# Patient Record
Sex: MALE | Race: WHITE | Employment: OTHER | URBAN - METROPOLITAN AREA
[De-identification: names, ages, dates, MRNs, and addresses within clinical notes are randomized per-mention and may not be internally consistent; named-entity substitution may affect disease eponyms.]

---

## 2018-08-14 ENCOUNTER — OFFICE VISIT (OUTPATIENT)
Dept: SLEEP MEDICINE | Age: 63
End: 2018-08-14

## 2018-08-14 VITALS
WEIGHT: 184 LBS | OXYGEN SATURATION: 98 % | SYSTOLIC BLOOD PRESSURE: 122 MMHG | HEART RATE: 67 BPM | HEIGHT: 71 IN | BODY MASS INDEX: 25.76 KG/M2 | DIASTOLIC BLOOD PRESSURE: 72 MMHG

## 2018-08-14 DIAGNOSIS — F41.9 ANXIETY: Chronic | ICD-10-CM

## 2018-08-14 DIAGNOSIS — M79.7 FIBROMYALGIA: Chronic | ICD-10-CM

## 2018-08-14 DIAGNOSIS — E03.9 ACQUIRED HYPOTHYROIDISM: Chronic | ICD-10-CM

## 2018-08-14 DIAGNOSIS — K21.9 GERD WITHOUT ESOPHAGITIS: Chronic | ICD-10-CM

## 2018-08-14 DIAGNOSIS — G89.4 CHRONIC PAIN SYNDROME: Chronic | ICD-10-CM

## 2018-08-14 DIAGNOSIS — G47.33 OBSTRUCTIVE SLEEP APNEA SYNDROME: Primary | ICD-10-CM

## 2018-08-14 PROCEDURE — 99204 OFFICE O/P NEW MOD 45 MIN: CPT | Performed by: INTERNAL MEDICINE

## 2018-08-14 RX ORDER — DULOXETIN HYDROCHLORIDE 60 MG/1
60 CAPSULE, DELAYED RELEASE ORAL DAILY
COMMUNITY

## 2018-08-14 RX ORDER — DULOXETIN HYDROCHLORIDE 60 MG/1
120 CAPSULE, DELAYED RELEASE ORAL
COMMUNITY
Start: 2018-04-17 | End: 2018-08-14

## 2018-08-14 RX ORDER — LORAZEPAM 1 MG/1
1 TABLET ORAL EVERY 6 HOURS PRN
COMMUNITY

## 2018-08-14 RX ORDER — QUININE SULFATE 324 MG/1
324 CAPSULE ORAL
COMMUNITY
Start: 2018-06-26

## 2018-08-14 RX ORDER — LORAZEPAM 1 MG/1
TABLET ORAL
COMMUNITY
Start: 2018-07-17 | End: 2018-08-14

## 2018-08-14 ASSESSMENT — ENCOUNTER SYMPTOMS
APNEA: 1
EYE PAIN: 0
VOMITING: 0
NAUSEA: 0
ABDOMINAL DISTENTION: 0
ALLERGIC/IMMUNOLOGIC NEGATIVE: 1
RHINORRHEA: 0
CHEST TIGHTNESS: 0
PHOTOPHOBIA: 0
CHOKING: 0
ABDOMINAL PAIN: 0
SHORTNESS OF BREATH: 0
BACK PAIN: 1

## 2018-08-14 ASSESSMENT — SLEEP AND FATIGUE QUESTIONNAIRES
HOW LIKELY ARE YOU TO NOD OFF OR FALL ASLEEP WHILE LYING DOWN TO REST IN THE AFTERNOON WHEN CIRCUMSTANCES PERMIT: 3
HOW LIKELY ARE YOU TO NOD OFF OR FALL ASLEEP WHILE SITTING QUIETLY AFTER LUNCH WITHOUT ALCOHOL: 2
HOW LIKELY ARE YOU TO NOD OFF OR FALL ASLEEP WHILE SITTING AND TALKING TO SOMEONE: 1
HOW LIKELY ARE YOU TO NOD OFF OR FALL ASLEEP WHILE WATCHING TV: 3
HOW LIKELY ARE YOU TO NOD OFF OR FALL ASLEEP WHEN YOU ARE A PASSENGER IN A CAR FOR AN HOUR WITHOUT A BREAK: 2
HOW LIKELY ARE YOU TO NOD OFF OR FALL ASLEEP WHILE SITTING AND READING: 2
HOW LIKELY ARE YOU TO NOD OFF OR FALL ASLEEP WHILE SITTING INACTIVE IN A PUBLIC PLACE: 2
HOW LIKELY ARE YOU TO NOD OFF OR FALL ASLEEP IN A CAR, WHILE STOPPED FOR A FEW MINUTES IN TRAFFIC: 0
NECK CIRCUMFERENCE (INCHES): 17
ESS TOTAL SCORE: 15

## 2018-08-14 NOTE — PROGRESS NOTES
Mario Schreiber MD, FAASM, [facility]  [facility] [place]  [facility]  3rd Floor,  [address] [facility], [address] [phone]   [address] [place] [address]  [address] [place] [address] Mellisa Chisholm. Lila Aragon [address] [phone]     [facility] [place] SLEEP MEDICINE    Subjective:     Patient ID: Maricarmen Logan is a 70 y.o. male. Chief Complaint   Patient presents with    Sleep Apnea       HPI:      Maricarmen Logan is a 70 y.o. male self-referred for a sleep evaluation. He complains of snoring, periods of not breathing, tossing and turning, decreased memory, decreased concentration, excessive daytime sleepiness, feels sleepy during the day, take naps during the day, sleep walking, and drowsiness while driving but he denies knees buckling with laughing, completely or partially paralyzed while falling asleep or waking up. Symptoms began >15 years ago, gradually worsening since that time. Previous evaluation and treatment has included- PSG. Dx with ZAKIA and parasomnia over 15 years ago at [facility]. No old records at time of visit. Was given CPAP and some meds and did well for a few years. Then was told could stop CPAP and did fine for few more years then Sx returned. DOT/CDL - No  FAA/'s license - No      Previous Report(s) Reviewed: historical medical records, office notes and referral letter/letters     [place] - Total score: 15    Caffeine Intake - 3 cans/bottles of caffeinated soda pop per day(s)    Social History     Social History    Marital status:      Spouse name: N/A    Number of children: N/A    Years of education: N/A     Occupational History    Not on file.      Social History Main Topics    Smoking status: Former Smoker     Packs/day: 2.00     Years: 10.00    Smokeless tobacco: Never Used    Alcohol use No    Drug use: No    Sexual activity: Not on file     Other Topics Concern    Not on file     Social History Narrative    No narrative on file        Current Outpatient Prescriptions   Medication Sig Dispense Refill    quiNINE 324 MG capsule Take 324 mg by mouth      LORazepam (ATIVAN) 1 MG tablet Take 1 mg by mouth every 6 hours as needed for Anxiety. [facility] DULoxetine (CYMBALTA) 60 MG extended release capsule Take 60 mg by mouth daily      levothyroxine (SYNTHROID) 75 MCG tablet TAKE 1 TABLET BY MOUTH EVERY DAY 90 tablet 1    oxyCODONE-acetaminophen (PERCOCET) 5-325 MG per tablet Take 1 tablet by mouth every 12 hours as needed for Pain      tamsulosin (FLOMAX) 0.4 MG capsule Take 0.4 mg by mouth every evening.  pantoprazole (PROTONIX) 40 MG tablet Take 40 mg by mouth daily as needed.  QUEtiapine (SEROQUEL) 200 MG tablet Take 200 mg by mouth every evening.  clopidogrel (PLAVIX) 75 MG tablet Take 75 mg by mouth daily.  aspirin 81 MG tablet Take 81 mg by mouth daily.  fentaNYL (DURAGESIC) 25 MCG/HR Place 1 patch onto the skin every 72 hours      atorvastatin (LIPITOR) 10 MG tablet Take 10 mg by mouth every evening. No current facility-administered medications for this visit.         Allergies as of 08/14/2018 - Review Complete 08/14/2018   Allergen Reaction Noted    Lyrica [pregabalin] Shortness Of Breath 03/31/2015    Ambien [zolpidem tartrate] Other (See Comments) 03/31/2015    Codeine Nausea Only and Rash 03/31/2015    Zofran [ondansetron hcl] Nausea And Vomiting 12/10/2015       Patient Active Problem List   Diagnosis    Chronic pain syndrome    Failed back surgical syndrome    Anxiety    Fibromyalgia    Insomnia    Lumbar spondylosis    Essential and other specified forms of tremor    Headache    Encephalitis, myelitis, and encephalomyelitis ([facility] [address]    Mild cognitive impairment    Hypothyroidism    Depressive disorder, not elsewhere classified    Lumbar degenerative disc disease    Insomnia due to medical condition    Vertigo    Ataxia    Obstructive sleep apnea syndrome       Past Medical History:   Diagnosis Date    Anxiety     CAD (coronary artery disease) 2/2/15    Diagnosed in [facility] Chronic kidney disease     After angioplasty 2/15    Chronic pain syndrome     Failed back surgical syndrome     Fibromyalgia     Hypothyroidism     Insomnia     Irregular heart beat     Lumbar spondylosis     Obstructive sleep apnea syndrome 8/14/2018       Past Surgical History:   Procedure Laterality Date    CORONARY ANGIOPLASTY WITH STENT PLACEMENT      CORONARY ANGIOPLASTY WITH STENT PLACEMENT  [facility] [address]         History reviewed. No pertinent family history. Review of Systems   Constitutional: Positive for fatigue. Negative for activity change and appetite change. HENT: Positive for congestion. Negative for nosebleeds, postnasal drip, rhinorrhea and sneezing. Eyes: Negative for photophobia, pain and visual disturbance. Respiratory: Positive for apnea. Negative for choking, chest tightness and shortness of breath. Cardiovascular: Negative. Gastrointestinal: Negative for abdominal distention, abdominal pain, nausea and vomiting. Endocrine: Negative for cold intolerance and heat intolerance. Genitourinary: Negative for difficulty urinating, dysuria, frequency and urgency. Musculoskeletal: Positive for back pain and myalgias. Negative for neck pain and neck stiffness. Skin: Negative. Allergic/Immunologic: Negative. Neurological: Negative for tremors, seizures, syncope and weakness. Hematological: Negative for adenopathy. Does not bruise/bleed easily. Psychiatric/Behavioral: Positive for decreased concentration and sleep disturbance. Negative for agitation, behavioral problems and confusion. Objective:     Vitals:  Weight BMI   Wt Readings from Last 3 Encounters:   08/14/18 184 lb (83.5 kg)   06/21/16 179 lb 3.2 oz (81.3 kg)   12/10/15 184 lb 8 oz (83.7 kg)    Body mass index is 25.66 kg/m².      BP HR SaO2   BP Readings from Last 3 Encounters:   08/14/18 122/72   06/21/16 107/64   12/10/15 99/63    Pulse Readings from Last 3 Encounters:   08/14/18 67   06/21/16 76   12/10/15 70    SpO2 Readings from Last 3 Encounters:   08/14/18 98%   06/21/16 98%   12/10/15 98%        Physical Exam   Constitutional: He is oriented to person, place, and time. Vital signs are normal. He appears well-developed and well-nourished. Non-toxic appearance. He does not have a sickly appearance. HENT:   Head: Normocephalic and atraumatic. Not macrocephalic and not microcephalic. Right Ear: External ear normal.   Left Ear: External ear normal.   Nose: Mucosal edema and septal deviation present. Mouth/Throat: Uvula is midline and mucous membranes are normal. Posterior oropharyngeal edema present. No oropharyngeal exudate or tonsillar abscesses. Tonsils:   absent bilaterally, normal appearance  Post. Pharynx:   normal mucosa  Neck circumference: 17  Inches     Eyes: Pupils are equal, round, and reactive to light. Conjunctivae, EOM and lids are normal.   Neck: Trachea normal and normal range of motion. Neck supple. No tracheal deviation present. No thyroid mass and no thyromegaly present. Cardiovascular: Normal rate, regular rhythm, S1 normal, S2 normal and normal heart sounds. Pulmonary/Chest: Effort normal and breath sounds normal. No apnea. No respiratory distress. He has no wheezes. He has no rhonchi. He has no rales. Abdominal: Soft. Bowel sounds are normal. He exhibits no distension. There is no tenderness. Musculoskeletal: Normal range of motion. He exhibits no edema. Lymphadenopathy:        Head (right side): No submental, no submandibular and no tonsillar adenopathy present. Head (left side): No submental, no submandibular and no tonsillar adenopathy present. Neurological: He is alert and oriented to person, place, and time. Skin: Skin is warm, dry and intact. No cyanosis. Nails show no clubbing.    Psychiatric: He has a normal mood and affect. His speech is normal and behavior is normal. Thought content normal.   Nursing note and vitals reviewed. Assessment:      Visit Diagnoses and Associated Orders     Obstructive sleep apnea syndrome   (New Problem)  -  Primary    needs w/u and Tx    Baseline Diagnostic Sleep Study [82500 Custom]   - Future Order         Acquired hypothyroidism   (Stable)           GERD without esophagitis   (Stable)           Anxiety   (Stable)           Fibromyalgia   (Stable)           Chronic pain syndrome   (Stable)               Plan: Will attempt to get old records. Differential diagnosis includes but not limited to: ZAKIA, PLMD's, narcolepsy, parasomnias. Reviewed ZAKIA (which is the highest likelihood diagnosis): pathophysiology, diagnosis, complications and treatment. Instructed him not to drive if drowsy. Continue medications per his PCP and other physicians. Limit caffeine use after 3pm. Will do PSG to rule-out ZAKIA and other sleep disorders. 1 wk follow up after study to review his results. The chronic medical conditions listed are directly related to the primary diagnosis listed above. The management of the primary diagnosis affects the secondary diagnosis and vice versa. Cont meds for hypothyroid, GERD, JESSY, and fibromyalgia. Given chronic narcotics needs to watch for CSA as well.     Orders Placed This Encounter   Procedures    Baseline Diagnostic Sleep Study            Electronically signed by Marylene Hirschfeld, MD on 8/14/2018 at 11:03 AM

## 2018-08-16 ENCOUNTER — HOSPITAL ENCOUNTER (OUTPATIENT)
Dept: SLEEP MEDICINE | Age: 63
Discharge: HOME | End: 2018-08-17
Attending: INTERNAL MEDICINE | Admitting: INTERNAL MEDICINE

## 2018-08-16 DIAGNOSIS — G47.33 OBSTRUCTIVE SLEEP APNEA SYNDROME: ICD-10-CM

## 2018-08-16 PROCEDURE — 95810 POLYSOM 6/> YRS 4/> PARAM: CPT | Performed by: INTERNAL MEDICINE

## 2018-08-20 ENCOUNTER — TELEPHONE (OUTPATIENT)
Dept: SLEEP MEDICINE | Age: 63
End: 2018-08-20

## 2018-08-21 ENCOUNTER — TELEPHONE (OUTPATIENT)
Dept: SLEEP MEDICINE | Age: 63
End: 2018-08-21

## 2018-08-21 NOTE — TELEPHONE ENCOUNTER
Spoke with spouse she is concerned about charge for repeat study. Spouse to be called back with information.

## 2018-09-09 ENCOUNTER — HOSPITAL ENCOUNTER (OUTPATIENT)
Dept: SLEEP MEDICINE | Age: 63
Discharge: HOME | End: 2018-09-11
Attending: INTERNAL MEDICINE | Admitting: INTERNAL MEDICINE

## 2018-09-11 PROCEDURE — 95810 POLYSOM 6/> YRS 4/> PARAM: CPT | Performed by: INTERNAL MEDICINE

## 2018-09-12 ENCOUNTER — TELEPHONE (OUTPATIENT)
Dept: SLEEP MEDICINE | Age: 63
End: 2018-09-12

## 2018-09-13 ENCOUNTER — TELEPHONE (OUTPATIENT)
Dept: SLEEP MEDICINE | Age: 63
End: 2018-09-13

## 2018-09-24 ENCOUNTER — HOSPITAL ENCOUNTER (OUTPATIENT)
Dept: SLEEP CENTER | Age: 63
Discharge: HOME | End: 2018-09-24
Payer: MEDICARE

## 2018-09-24 DIAGNOSIS — G47.33 OBSTRUCTIVE SLEEP APNEA (ADULT) (PEDIATRIC): ICD-10-CM

## 2018-09-24 DIAGNOSIS — G47.33 OBSTRUCTIVE SLEEP APNEA (ADULT) (PEDIATRIC): Primary | ICD-10-CM

## 2018-09-24 PROCEDURE — 95811 POLYSOM 6/>YRS CPAP 4/> PARM: CPT

## 2018-09-27 PROCEDURE — 95811 POLYSOM 6/>YRS CPAP 4/> PARM: CPT | Performed by: INTERNAL MEDICINE

## 2018-09-28 ENCOUNTER — TELEPHONE (OUTPATIENT)
Dept: PULMONOLOGY | Age: 63
End: 2018-09-28

## 2018-09-28 NOTE — TELEPHONE ENCOUNTER
Spouse returning call to order unit and schedule f/u. Unit to be ordered from [facility] due to the proximity to pt home. F/U scheduled and pt was asked to bring unit.

## 2018-10-24 ENCOUNTER — TELEPHONE (OUTPATIENT)
Dept: PULMONOLOGY | Age: 63
End: 2018-10-24

## 2018-12-17 ENCOUNTER — OFFICE VISIT (OUTPATIENT)
Dept: PULMONOLOGY | Age: 63
End: 2018-12-17
Payer: MEDICARE

## 2018-12-17 VITALS
HEIGHT: 71 IN | SYSTOLIC BLOOD PRESSURE: 123 MMHG | OXYGEN SATURATION: 98 % | HEART RATE: 63 BPM | BODY MASS INDEX: 26.32 KG/M2 | WEIGHT: 188 LBS | DIASTOLIC BLOOD PRESSURE: 70 MMHG

## 2018-12-17 DIAGNOSIS — M79.7 FIBROMYALGIA: Chronic | ICD-10-CM

## 2018-12-17 DIAGNOSIS — F41.9 ANXIETY: Chronic | ICD-10-CM

## 2018-12-17 DIAGNOSIS — G47.33 OBSTRUCTIVE SLEEP APNEA (ADULT) (PEDIATRIC): ICD-10-CM

## 2018-12-17 DIAGNOSIS — E03.9 ACQUIRED HYPOTHYROIDISM: Chronic | ICD-10-CM

## 2018-12-17 PROCEDURE — G8427 DOCREV CUR MEDS BY ELIG CLIN: HCPCS | Performed by: INTERNAL MEDICINE

## 2018-12-17 PROCEDURE — 1101F PT FALLS ASSESS-DOCD LE1/YR: CPT | Performed by: INTERNAL MEDICINE

## 2018-12-17 PROCEDURE — G8419 CALC BMI OUT NRM PARAM NOF/U: HCPCS | Performed by: INTERNAL MEDICINE

## 2018-12-17 PROCEDURE — 1036F TOBACCO NON-USER: CPT | Performed by: INTERNAL MEDICINE

## 2018-12-17 PROCEDURE — G8484 FLU IMMUNIZE NO ADMIN: HCPCS | Performed by: INTERNAL MEDICINE

## 2018-12-17 PROCEDURE — 1123F ACP DISCUSS/DSCN MKR DOCD: CPT | Performed by: INTERNAL MEDICINE

## 2018-12-17 PROCEDURE — 99214 OFFICE O/P EST MOD 30 MIN: CPT | Performed by: INTERNAL MEDICINE

## 2018-12-17 PROCEDURE — 4040F PNEUMOC VAC/ADMIN/RCVD: CPT | Performed by: INTERNAL MEDICINE

## 2018-12-17 PROCEDURE — 3017F COLORECTAL CA SCREEN DOC REV: CPT | Performed by: INTERNAL MEDICINE

## 2018-12-17 ASSESSMENT — SLEEP AND FATIGUE QUESTIONNAIRES
HOW LIKELY ARE YOU TO NOD OFF OR FALL ASLEEP WHILE SITTING AND READING: 1
ESS TOTAL SCORE: 8
HOW LIKELY ARE YOU TO NOD OFF OR FALL ASLEEP WHILE SITTING INACTIVE IN A PUBLIC PLACE: 1
HOW LIKELY ARE YOU TO NOD OFF OR FALL ASLEEP WHILE SITTING AND TALKING TO SOMEONE: 1
HOW LIKELY ARE YOU TO NOD OFF OR FALL ASLEEP IN A CAR, WHILE STOPPED FOR A FEW MINUTES IN TRAFFIC: 0
HOW LIKELY ARE YOU TO NOD OFF OR FALL ASLEEP WHILE LYING DOWN TO REST IN THE AFTERNOON WHEN CIRCUMSTANCES PERMIT: 3
HOW LIKELY ARE YOU TO NOD OFF OR FALL ASLEEP WHEN YOU ARE A PASSENGER IN A CAR FOR AN HOUR WITHOUT A BREAK: 1
HOW LIKELY ARE YOU TO NOD OFF OR FALL ASLEEP WHILE SITTING QUIETLY AFTER LUNCH WITHOUT ALCOHOL: 0
HOW LIKELY ARE YOU TO NOD OFF OR FALL ASLEEP WHILE WATCHING TV: 1

## 2018-12-17 ASSESSMENT — ENCOUNTER SYMPTOMS
STRIDOR: 0
PHOTOPHOBIA: 0
SINUS PRESSURE: 0
EYE PAIN: 0
CHEST TIGHTNESS: 0
SHORTNESS OF BREATH: 0

## 2018-12-17 NOTE — PROGRESS NOTES
Allergies as of 12/17/2018 - Review Complete 12/17/2018   Allergen Reaction Noted    Lyrica [pregabalin] Shortness Of Breath 03/31/2015    Ambien [zolpidem tartrate] Other (See Comments) 03/31/2015    Codeine Nausea Only and Rash 03/31/2015    Zofran [ondansetron hcl] Nausea And Vomiting 12/10/2015       Patient Active Problem List   Diagnosis    Chronic pain syndrome    Failed back surgical syndrome    Anxiety    Fibromyalgia    Insomnia    Lumbar spondylosis    Essential and other specified forms of tremor    Headache    Encephalitis, myelitis, and encephalomyelitis ([facility] [address]    Mild cognitive impairment    Hypothyroidism    Depressive disorder, not elsewhere classified    Lumbar degenerative disc disease    Insomnia due to medical condition    Vertigo    Ataxia    Obstructive sleep apnea (adult) (pediatric)       Past Medical History:   Diagnosis Date    Anxiety     CAD (coronary artery disease) 2/2/15    Diagnosed in [place] Chronic kidney disease     After angioplasty 2/15    Chronic pain syndrome     Failed back surgical syndrome     Fibromyalgia     Hypothyroidism     Insomnia     Irregular heart beat     Lumbar spondylosis     Obstructive sleep apnea (adult) (pediatric) 8/14/2018    Obstructive sleep apnea syndrome 8/14/2018       Past Surgical History:   Procedure Laterality Date    CORONARY ANGIOPLASTY WITH STENT PLACEMENT      CORONARY ANGIOPLASTY WITH STENT PLACEMENT  2/2/15 [place] [address]         History reviewed. No pertinent family history. ROS:  Review of Systems   Constitutional: Positive for fatigue. HENT: Negative for dental problem, ear pain, nosebleeds, sinus pressure and sneezing. Eyes: Negative for photophobia, pain and visual disturbance. Respiratory: Negative for chest tightness, shortness of breath and stridor. Cardiovascular: Negative for chest pain, palpitations and leg swelling.    Genitourinary:

## 2018-12-21 ENCOUNTER — TELEPHONE (OUTPATIENT)
Dept: PULMONOLOGY | Age: 63
End: 2018-12-21

## 2018-12-21 NOTE — TELEPHONE ENCOUNTER
Called to let the patient know he will need to bring the machine in for us to adjust the pressures.  He will stop by the [facility] on Wednesday

## 2018-12-21 NOTE — TELEPHONE ENCOUNTER
Spouse called for patient yesterday evening @ 6:08 p.m. LOV 12/17/18    When patient saw Dr. Sandie Mckeon 12/17, he set the pressure up on his machiine. Patient thinks it may be too high because for last few days, it has been squirting out air/water? Did not say what was coming out but unable to use the machine and has turned it off. Would like to know if pressure can be adjusted remotely or do they need to bring machine in for adjustment. She can be reached @ [phone].